# Patient Record
Sex: FEMALE | Race: WHITE | Employment: OTHER | URBAN - METROPOLITAN AREA
[De-identification: names, ages, dates, MRNs, and addresses within clinical notes are randomized per-mention and may not be internally consistent; named-entity substitution may affect disease eponyms.]

---

## 2022-07-29 NOTE — PATIENT DISCUSSION
Gave sample of Zylet. Advised pt to not use drops with CL's in. Use b.i.d. x 3-4 days; 1 x in a.m and 1 x in p. m.

## 2023-01-03 ENCOUNTER — CONSULTATION/EVALUATION (OUTPATIENT)
Dept: URBAN - METROPOLITAN AREA CLINIC 43 | Facility: CLINIC | Age: 63
End: 2023-01-03

## 2023-01-03 DIAGNOSIS — H31.003: ICD-10-CM

## 2023-01-03 DIAGNOSIS — H25.11: ICD-10-CM

## 2023-01-03 DIAGNOSIS — H20.9: ICD-10-CM

## 2023-01-03 DIAGNOSIS — H35.723: ICD-10-CM

## 2023-01-03 DIAGNOSIS — H35.3131: ICD-10-CM

## 2023-01-03 DIAGNOSIS — H30.133: ICD-10-CM

## 2023-01-03 DIAGNOSIS — H35.30: ICD-10-CM

## 2023-01-03 PROCEDURE — 92250 FUNDUS PHOTOGRAPHY W/I&R: CPT

## 2023-01-03 PROCEDURE — 99204 OFFICE O/P NEW MOD 45 MIN: CPT

## 2023-01-03 ASSESSMENT — TONOMETRY
OD_IOP_MMHG: 14
OS_IOP_MMHG: 15

## 2023-01-03 ASSESSMENT — VISUAL ACUITY
OD_CC: 20/100-1
OS_CC: 20/40

## 2023-07-11 ENCOUNTER — PREPPED CHART (OUTPATIENT)
Dept: URBAN - METROPOLITAN AREA CLINIC 46 | Facility: CLINIC | Age: 63
End: 2023-07-11